# Patient Record
Sex: FEMALE | Race: WHITE | NOT HISPANIC OR LATINO
[De-identification: names, ages, dates, MRNs, and addresses within clinical notes are randomized per-mention and may not be internally consistent; named-entity substitution may affect disease eponyms.]

---

## 2024-07-05 ENCOUNTER — APPOINTMENT (OUTPATIENT)
Dept: ORTHOPEDIC SURGERY | Facility: CLINIC | Age: 24
End: 2024-07-05
Payer: OTHER MISCELLANEOUS

## 2024-07-05 PROBLEM — Z00.00 ENCOUNTER FOR PREVENTIVE HEALTH EXAMINATION: Status: ACTIVE | Noted: 2024-07-05

## 2024-07-05 PROCEDURE — 99204 OFFICE O/P NEW MOD 45 MIN: CPT | Mod: 57

## 2024-07-05 PROCEDURE — 28400 CLTX CALCANEAL FX W/O MNPJ: CPT | Mod: LT

## 2024-07-17 ENCOUNTER — APPOINTMENT (OUTPATIENT)
Dept: ORTHOPEDIC SURGERY | Facility: CLINIC | Age: 24
End: 2024-07-17
Payer: OTHER MISCELLANEOUS

## 2024-07-17 ENCOUNTER — TRANSCRIPTION ENCOUNTER (OUTPATIENT)
Age: 24
End: 2024-07-17

## 2024-07-17 DIAGNOSIS — S92.025D NONDISPLACED FRACTURE OF ANTERIOR PROCESS OF LEFT CALCANEUS, SUBSEQUENT ENCOUNTER FOR FRACTURE WITH ROUTINE HEALING: ICD-10-CM

## 2024-07-17 PROCEDURE — 99024 POSTOP FOLLOW-UP VISIT: CPT

## 2024-07-17 PROCEDURE — L4361: CPT | Mod: LT

## 2024-07-17 PROCEDURE — 73650 X-RAY EXAM OF HEEL: CPT | Mod: LT

## 2024-08-07 ENCOUNTER — APPOINTMENT (OUTPATIENT)
Dept: ORTHOPEDIC SURGERY | Facility: CLINIC | Age: 24
End: 2024-08-07

## 2024-08-07 PROCEDURE — 99024 POSTOP FOLLOW-UP VISIT: CPT

## 2024-08-07 PROCEDURE — 73650 X-RAY EXAM OF HEEL: CPT | Mod: LT

## 2024-08-07 NOTE — PHYSICAL EXAM
[NL (40)] : plantar flexion 40 degrees [2+] : posterior tibialis pulse: 2+ [Left] : left calcaneus [The fracture is in acceptable alignment. There is progression in healing seen] : The fracture is in acceptable alignment. There is progression in healing seen [] : no calcaneus tenderness [4___] : plantar flexion 4[unfilled]/5 [FreeTextEntry3] : skin intact out of boot [de-identified] : stable anterior process of calcaneus fracture [TWNoteComboBox7] : dorsiflexion 5 degrees

## 2024-08-07 NOTE — DISCUSSION/SUMMARY
[de-identified] : I reviewed patient's radiographs and discussed her condition and treatment options.  May advance to WBAT in CAM boot.  Follow up in 4 weeks XRs foot.  Patient voiced understanding and agreement with the plan.

## 2024-09-03 ENCOUNTER — APPOINTMENT (OUTPATIENT)
Dept: ORTHOPEDIC SURGERY | Facility: CLINIC | Age: 24
End: 2024-09-03
Payer: COMMERCIAL

## 2024-09-03 DIAGNOSIS — S92.025D NONDISPLACED FRACTURE OF ANTERIOR PROCESS OF LEFT CALCANEUS, SUBSEQUENT ENCOUNTER FOR FRACTURE WITH ROUTINE HEALING: ICD-10-CM

## 2024-09-03 PROCEDURE — 73630 X-RAY EXAM OF FOOT: CPT | Mod: LT

## 2024-09-03 PROCEDURE — 99024 POSTOP FOLLOW-UP VISIT: CPT

## 2024-09-03 NOTE — PHYSICAL EXAM
[NL (40)] : plantar flexion 40 degrees [4___] : plantar flexion 4[unfilled]/5 [2+] : posterior tibialis pulse: 2+ [] : patient ambulates without assistive device [Left] : left foot [The fracture is in acceptable alignment. There is progression in healing seen] : The fracture is in acceptable alignment. There is progression in healing seen [FreeTextEntry3] : skin intact out of boot [de-identified] : stable anterior process of calcaneus fracture [TWNoteComboBox7] : dorsiflexion 5 degrees

## 2024-09-03 NOTE — DISCUSSION/SUMMARY
[de-identified] : I reviewed patient's radiographs and discussed her condition and treatment course.  Wean out of CAM boot.  Start PT and HEP.  Follow up in 4 weeks XRs.  Patient voiced understanding and agreement with the plan.

## 2024-09-03 NOTE — HISTORY OF PRESENT ILLNESS
[de-identified] :  Since last visit, patient reports improvement. Denies any pain. Presents in CAM boot as directed and walking without any assistive devices.

## 2024-09-06 ENCOUNTER — APPOINTMENT (OUTPATIENT)
Dept: ORTHOPEDIC SURGERY | Facility: CLINIC | Age: 24
End: 2024-09-06

## 2024-10-08 ENCOUNTER — APPOINTMENT (OUTPATIENT)
Dept: ORTHOPEDIC SURGERY | Facility: CLINIC | Age: 24
End: 2024-10-08

## 2024-10-08 DIAGNOSIS — S92.025D NONDISPLACED FRACTURE OF ANTERIOR PROCESS OF LEFT CALCANEUS, SUBSEQUENT ENCOUNTER FOR FRACTURE WITH ROUTINE HEALING: ICD-10-CM

## 2024-10-08 PROCEDURE — 99212 OFFICE O/P EST SF 10 MIN: CPT

## 2024-10-08 PROCEDURE — 73630 X-RAY EXAM OF FOOT: CPT | Mod: LT

## 2024-12-27 ENCOUNTER — APPOINTMENT (OUTPATIENT)
Dept: ORTHOPEDIC SURGERY | Facility: CLINIC | Age: 24
End: 2024-12-27
Payer: OTHER MISCELLANEOUS

## 2024-12-27 DIAGNOSIS — S92.025D NONDISPLACED FRACTURE OF ANTERIOR PROCESS OF LEFT CALCANEUS, SUBSEQUENT ENCOUNTER FOR FRACTURE WITH ROUTINE HEALING: ICD-10-CM

## 2024-12-27 PROCEDURE — 99213 OFFICE O/P EST LOW 20 MIN: CPT
